# Patient Record
Sex: FEMALE | Race: WHITE | Employment: OTHER | URBAN - METROPOLITAN AREA
[De-identification: names, ages, dates, MRNs, and addresses within clinical notes are randomized per-mention and may not be internally consistent; named-entity substitution may affect disease eponyms.]

---

## 2017-03-28 ENCOUNTER — HOSPITAL ENCOUNTER (EMERGENCY)
Age: 57
Discharge: HOME | End: 2017-03-28
Attending: EMERGENCY MEDICINE | Admitting: EMERGENCY MEDICINE
Payer: COMMERCIAL

## 2017-03-28 ENCOUNTER — APPOINTMENT (OUTPATIENT)
Dept: GENERAL RADIOLOGY | Age: 57
End: 2017-03-28
Attending: EMERGENCY MEDICINE
Payer: COMMERCIAL

## 2017-03-28 VITALS
BODY MASS INDEX: 29.66 KG/M2 | TEMPERATURE: 98.9 F | HEART RATE: 73 BPM | OXYGEN SATURATION: 95 % | WEIGHT: 178 LBS | RESPIRATION RATE: 16 BRPM | SYSTOLIC BLOOD PRESSURE: 105 MMHG | HEIGHT: 65 IN | DIASTOLIC BLOOD PRESSURE: 53 MMHG

## 2017-03-28 DIAGNOSIS — R06.02 SOB (SHORTNESS OF BREATH): ICD-10-CM

## 2017-03-28 DIAGNOSIS — M25.512 ACUTE PAIN OF LEFT SHOULDER: Primary | ICD-10-CM

## 2017-03-28 LAB
ALBUMIN SERPL BCP-MCNC: 3.6 G/DL (ref 3.4–5)
ALBUMIN/GLOB SERPL: 1.3 {RATIO} (ref 0.8–1.7)
ALP SERPL-CCNC: 67 U/L (ref 45–117)
ALT SERPL-CCNC: 26 U/L (ref 13–56)
ANION GAP BLD CALC-SCNC: 7 MMOL/L (ref 3–18)
AST SERPL W P-5'-P-CCNC: 17 U/L (ref 15–37)
ATRIAL RATE: 69 BPM
BASOPHILS # BLD AUTO: 0.1 K/UL (ref 0–0.06)
BASOPHILS # BLD: 1 % (ref 0–2)
BILIRUB SERPL-MCNC: 0.3 MG/DL (ref 0.2–1)
BNP SERPL-MCNC: 18 PG/ML (ref 0–900)
BUN SERPL-MCNC: 10 MG/DL (ref 7–18)
BUN/CREAT SERPL: 16 (ref 12–20)
CALCIUM SERPL-MCNC: 8.5 MG/DL (ref 8.5–10.1)
CALCULATED P AXIS, ECG09: 65 DEGREES
CALCULATED R AXIS, ECG10: 30 DEGREES
CALCULATED T AXIS, ECG11: 27 DEGREES
CHLORIDE SERPL-SCNC: 104 MMOL/L (ref 100–108)
CO2 SERPL-SCNC: 28 MMOL/L (ref 21–32)
CREAT SERPL-MCNC: 0.64 MG/DL (ref 0.6–1.3)
DIAGNOSIS, 93000: NORMAL
DIFFERENTIAL METHOD BLD: ABNORMAL
EOSINOPHIL # BLD: 0.3 K/UL (ref 0–0.4)
EOSINOPHIL NFR BLD: 5 % (ref 0–5)
ERYTHROCYTE [DISTWIDTH] IN BLOOD BY AUTOMATED COUNT: 12.4 % (ref 11.6–14.5)
GLOBULIN SER CALC-MCNC: 2.7 G/DL (ref 2–4)
GLUCOSE SERPL-MCNC: 99 MG/DL (ref 74–99)
HCT VFR BLD AUTO: 37.9 % (ref 35–45)
HGB BLD-MCNC: 13 G/DL (ref 12–16)
LYMPHOCYTES # BLD AUTO: 45 % (ref 21–52)
LYMPHOCYTES # BLD: 2.3 K/UL (ref 0.9–3.6)
MCH RBC QN AUTO: 32.5 PG (ref 24–34)
MCHC RBC AUTO-ENTMCNC: 34.3 G/DL (ref 31–37)
MCV RBC AUTO: 94.8 FL (ref 74–97)
MONOCYTES # BLD: 0.4 K/UL (ref 0.05–1.2)
MONOCYTES NFR BLD AUTO: 8 % (ref 3–10)
NEUTS SEG # BLD: 2.1 K/UL (ref 1.8–8)
NEUTS SEG NFR BLD AUTO: 41 % (ref 40–73)
P-R INTERVAL, ECG05: 176 MS
PLATELET # BLD AUTO: 227 K/UL (ref 135–420)
PMV BLD AUTO: 10.8 FL (ref 9.2–11.8)
POTASSIUM SERPL-SCNC: 3.8 MMOL/L (ref 3.5–5.5)
PROT SERPL-MCNC: 6.3 G/DL (ref 6.4–8.2)
Q-T INTERVAL, ECG07: 370 MS
QRS DURATION, ECG06: 94 MS
QTC CALCULATION (BEZET), ECG08: 396 MS
RBC # BLD AUTO: 4 M/UL (ref 4.2–5.3)
SODIUM SERPL-SCNC: 139 MMOL/L (ref 136–145)
TROPONIN I BLD-MCNC: <0.04 NG/ML (ref 0–0.08)
TROPONIN I BLD-MCNC: <0.04 NG/ML (ref 0–0.08)
VENTRICULAR RATE, ECG03: 69 BPM
WBC # BLD AUTO: 5.2 K/UL (ref 4.6–13.2)

## 2017-03-28 PROCEDURE — 99285 EMERGENCY DEPT VISIT HI MDM: CPT

## 2017-03-28 PROCEDURE — 80053 COMPREHEN METABOLIC PANEL: CPT | Performed by: EMERGENCY MEDICINE

## 2017-03-28 PROCEDURE — 83880 ASSAY OF NATRIURETIC PEPTIDE: CPT | Performed by: EMERGENCY MEDICINE

## 2017-03-28 PROCEDURE — 85025 COMPLETE CBC W/AUTO DIFF WBC: CPT | Performed by: EMERGENCY MEDICINE

## 2017-03-28 PROCEDURE — 84484 ASSAY OF TROPONIN QUANT: CPT

## 2017-03-28 PROCEDURE — 71020 XR CHEST PA LAT: CPT

## 2017-03-28 PROCEDURE — 93005 ELECTROCARDIOGRAM TRACING: CPT

## 2017-03-28 NOTE — ED PROVIDER NOTES
HPI Comments: Hunter Norwood is a 60 y.o. Female no sig pmh who woke this morning with left shoulder, left arm numbness about 3am with her chest not feeling right. No sweats, nausea, vomiting, abd pain, recent malave, exertional cp, fatigue, edema, fcs. Smoke for several years. Sister about to get cabg tomorrow which has also caused pt worry and lack of sleep. No recent surgery, h/o cancer, vte, immobilization    The history is provided by the patient. Past Medical History:   Diagnosis Date    Anemia     Anxiety     Back pain     Diabetes ([address]     Hypertension     Left leg pain        Past Surgical History:   Procedure Laterality Date    HX GASTRIC BYPASS           Family History:   Problem Relation Age of Onset    Heart Disease Father        Social History     Social History    Marital status:      Spouse name: N/A    Number of children: N/A    Years of education: N/A     Occupational History    Not on file. Social History Main Topics    Smoking status: Current Every Day Smoker    Smokeless tobacco: Not on file    Alcohol use No    Drug use: Not on file    Sexual activity: Not on file     Other Topics Concern    Not on file     Social History Narrative         ALLERGIES: Review of patient's allergies indicates no known allergies. Review of Systems   Constitutional: Negative for fever. HENT: Negative for sore throat. Eyes: Negative for visual disturbance. Respiratory: Positive for shortness of breath. Negative for cough, wheezing and stridor. Cardiovascular: Negative for palpitations and leg swelling. Gastrointestinal: Negative for abdominal pain. Endocrine: Negative for polyuria. Genitourinary: Negative for difficulty urinating and dysuria. Musculoskeletal: Negative for gait problem. Skin: Negative for rash. Neurological: Negative for syncope. Hematological: Does not bruise/bleed easily. Psychiatric/Behavioral: Positive for sleep disturbance.    All other systems reviewed and are negative. Vitals:    03/28/17 0445 03/28/17 0500 03/28/17 0515   BP:  131/81 117/72   Pulse:  76 70   Resp:  11 12   Temp: 98.3 °F (36.8 °C)     SpO2:  99% 96%   Weight: 80.7 kg (178 lb)     Height: 5' 5\" (1.651 m)              Physical Exam   Constitutional: She is oriented to person, place, and time. She appears well-developed and well-nourished. No distress. HENT:   Head: Normocephalic and atraumatic. Right Ear: External ear normal.   Left Ear: External ear normal.   Nose: Nose normal.   Mouth/Throat: Uvula is midline, oropharynx is clear and moist and mucous membranes are normal.   Eyes: Conjunctivae are normal. No scleral icterus. Neck: Neck supple. Cardiovascular: Normal rate, regular rhythm, normal heart sounds and intact distal pulses. Pulmonary/Chest: Effort normal and breath sounds normal.   Abdominal: Soft. There is no tenderness. Musculoskeletal: She exhibits no edema. Neurological: She is alert and oriented to person, place, and time. Gait normal.   Skin: Skin is warm and dry. She is not diaphoretic. Psychiatric: Her behavior is normal.   Nursing note and vitals reviewed.        [facility]  ED Course       Procedures  Vitals:  Patient Vitals for the past 12 hrs:   Temp Pulse Resp BP SpO2   03/28/17 0515 - 70 12 117/72 96 %   03/28/17 0500 - 76 11 131/81 99 %   03/28/17 0445 98.3 °F (36.8 °C) - - - -         Medications ordered:   Medications - No data to display      Lab findings:  Recent Results (from the past 12 hour(s))   CBC WITH AUTOMATED DIFF    Collection Time: 03/28/17  4:55 AM   Result Value Ref Range    WBC 5.2 4.6 - 13.2 K/uL    RBC 4.00 (L) 4.20 - 5.30 M/uL    HGB 13.0 12.0 - 16.0 g/dL    HCT 37.9 35.0 - 45.0 %    MCV 94.8 74.0 - 97.0 FL    MCH 32.5 24.0 - 34.0 PG    MCHC 34.3 31.0 - 37.0 g/dL    RDW 12.4 11.6 - 14.5 %    PLATELET 962 164 - 619 K/uL    MPV 10.8 9.2 - 11.8 FL    NEUTROPHILS 41 40 - 73 %    LYMPHOCYTES 45 21 - 52 %    MONOCYTES 8 3 - 10 %    EOSINOPHILS 5 0 - 5 %    BASOPHILS 1 0 - 2 %    ABS. NEUTROPHILS 2.1 1.8 - 8.0 K/UL    ABS. LYMPHOCYTES 2.3 0.9 - 3.6 K/UL    ABS. MONOCYTES 0.4 0.05 - 1.2 K/UL    ABS. EOSINOPHILS 0.3 0.0 - 0.4 K/UL    ABS. BASOPHILS 0.1 (H) 0.0 - 0.06 K/UL    DF AUTOMATED     METABOLIC PANEL, COMPREHENSIVE    Collection Time: 03/28/17  4:55 AM   Result Value Ref Range    Sodium 139 136 - 145 mmol/L    Potassium 3.8 3.5 - 5.5 mmol/L    Chloride 104 100 - 108 mmol/L    CO2 28 21 - 32 mmol/L    Anion gap 7 3.0 - 18 mmol/L    Glucose 99 74 - 99 mg/dL    BUN 10 7.0 - 18 MG/DL    Creatinine 0.64 0.6 - 1.3 MG/DL    BUN/Creatinine ratio 16 12 - 20      GFR est AA >60 >60 ml/min/1.73m2    GFR est non-AA >60 >60 ml/min/1.73m2    Calcium 8.5 8.5 - 10.1 MG/DL    Bilirubin, total 0.3 0.2 - 1.0 MG/DL    ALT (SGPT) 26 13 - 56 U/L    AST (SGOT) 17 15 - 37 U/L    Alk.  phosphatase 67 45 - 117 U/L    Protein, total 6.3 (L) 6.4 - 8.2 g/dL    Albumin 3.6 3.4 - 5.0 g/dL    Globulin 2.7 2.0 - 4.0 g/dL    A-G Ratio 1.3 0.8 - 1.7     PRO-BNP    Collection Time: 03/28/17  4:55 AM   Result Value Ref Range    NT pro-BNP 18 0 - 900 PG/ML   EKG, 12 LEAD, INITIAL    Collection Time: 03/28/17  5:08 AM   Result Value Ref Range    Ventricular Rate 69 BPM    Atrial Rate 69 BPM    P-R Interval 176 ms    QRS Duration 94 ms    Q-T Interval 370 ms    QTC Calculation (Bezet) 396 ms    Calculated P Axis 65 degrees    Calculated R Axis 30 degrees    Calculated T Axis 27 degrees    Diagnosis       Normal sinus rhythm  Normal ECG  When compared with ECG of 30-JUN-2014 11:02,  No significant change was found     POC TROPONIN-I    Collection Time: 03/28/17  5:16 AM   Result Value Ref Range    Troponin-I (POC) <0.04 0.00 - 0.08 ng/mL       EKG interpretation by ED Physician:  nsr with no acute st tw changes; no changed from 2014  Rate 69, qtc 396    Prehosp: nsr with no acute st tw changes  Rate 80. qtc 442        X-Ray, CT or other radiology findings or impressions:  XR CHEST PA LAT    (Results Pending)     Nap per my interp  Progress notes, Consult notes or additional Procedure notes:   Pt without sig pain, complaints desire for any medications  Will repeat istat trop at 3 hours which was d/w pt along with need for outpatient f/u  Will turn over to dr Edilma Swanson to recheck trop and d/c if neg    Reevaluation of patient:   Stable for d/c     Disposition:  Diagnosis:   1. Acute pain of left shoulder    2. SOB (shortness of breath)        Disposition: home    Follow-up Information     Follow up With Details Comments Contact Info    make an appointment with your regular doctor this week for recheck       [facility] EMERGENCY DEPT  If symptoms worsen [id number]  [phone]            Patient's Medications   Start Taking    No medications on file   Continue Taking    CYANOCOBALAMIN (VITAMIN B-12) 1,000 MCG/ML INJECTION    1,000 mcg by IntraMUSCular route every seven (7) days. These Medications have changed    No medications on file   Stop Taking    ATORVASTATIN (LIPITOR) 10 MG TABLET    Take  by mouth daily. AZITHROMYCIN (ZITHROMAX Z-LARY) 250 MG TABLET    Take 2 tablets PO today, then 1 tablet daily x 4 days    ESCITALOPRAM (LEXAPRO) 10 MG TABLET    Take 10 mg by mouth daily. FERROUS FUMARATE (IRON PO)    Take  by mouth. LISINOPRIL (PRINIVIL, ZESTRIL) 10 MG TABLET    Take  by mouth daily. METFORMIN (GLUCOPHAGE) 500 MG TABLET    Take  by mouth two (2) times daily (with meals). OXYCODONE-ACETAMINOPHEN (PERCOCET 10)  MG PER TABLET    Take 1 Tab by mouth every four (4) hours as needed for Pain.

## 2017-03-28 NOTE — ED NOTES
8: 40 AM Updating the patient on her troponin results. I had a long discussion with her regarding following up with Dr. Lili Bland. She verbalized understanding and agreement. Scribe Attestation:   Evelin Wilson acting as a scribe for and in the presence of Dr. Kellie Mcarthur MD March 28, 2017 at 8:37 AM       Signed by: Callie Acharya, March 28, 2017 at 8:40 AM       Provider Attestation:   I personally performed the services described in the documentation, reviewed the documentation, as recorded by the scribe in my presence, and it accurately and completely records my words and actions.      Reviewed and signed by:  Dr. Kellie Mcarthur MD

## 2017-04-01 ENCOUNTER — HOSPITAL ENCOUNTER (EMERGENCY)
Age: 57
Discharge: HOME | End: 2017-04-01
Attending: EMERGENCY MEDICINE
Payer: COMMERCIAL

## 2017-04-01 ENCOUNTER — APPOINTMENT (OUTPATIENT)
Dept: GENERAL RADIOLOGY | Age: 57
End: 2017-04-01
Attending: EMERGENCY MEDICINE
Payer: COMMERCIAL

## 2017-04-01 VITALS
HEIGHT: 66 IN | HEART RATE: 76 BPM | OXYGEN SATURATION: 97 % | DIASTOLIC BLOOD PRESSURE: 72 MMHG | BODY MASS INDEX: 28.93 KG/M2 | RESPIRATION RATE: 19 BRPM | SYSTOLIC BLOOD PRESSURE: 111 MMHG | WEIGHT: 180 LBS | TEMPERATURE: 98.2 F

## 2017-04-01 DIAGNOSIS — R07.9 ACUTE CHEST PAIN: Primary | ICD-10-CM

## 2017-04-01 LAB
ANION GAP BLD CALC-SCNC: 8 MMOL/L (ref 3–18)
APTT PPP: 26.9 SEC (ref 23–36.4)
ATRIAL RATE: 55 BPM
ATRIAL RATE: 67 BPM
BASOPHILS # BLD AUTO: 0 K/UL (ref 0–0.06)
BASOPHILS # BLD: 1 % (ref 0–2)
BUN SERPL-MCNC: 8 MG/DL (ref 7–18)
BUN/CREAT SERPL: 14 (ref 12–20)
CALCIUM SERPL-MCNC: 9.2 MG/DL (ref 8.5–10.1)
CALCULATED P AXIS, ECG09: 55 DEGREES
CALCULATED P AXIS, ECG09: 72 DEGREES
CALCULATED R AXIS, ECG10: 45 DEGREES
CALCULATED R AXIS, ECG10: 56 DEGREES
CALCULATED T AXIS, ECG11: 28 DEGREES
CALCULATED T AXIS, ECG11: 50 DEGREES
CHLORIDE SERPL-SCNC: 104 MMOL/L (ref 100–108)
CK MB CFR SERPL CALC: 1.2 % (ref 0–4)
CK MB SERPL-MCNC: 1.1 NG/ML (ref 5–25)
CK SERPL-CCNC: 94 U/L (ref 26–192)
CO2 SERPL-SCNC: 29 MMOL/L (ref 21–32)
CREAT SERPL-MCNC: 0.58 MG/DL (ref 0.6–1.3)
D DIMER PPP FEU-MCNC: <0.27 UG/ML(FEU)
DIAGNOSIS, 93000: NORMAL
DIAGNOSIS, 93000: NORMAL
DIFFERENTIAL METHOD BLD: ABNORMAL
EOSINOPHIL # BLD: 0.2 K/UL (ref 0–0.4)
EOSINOPHIL NFR BLD: 4 % (ref 0–5)
ERYTHROCYTE [DISTWIDTH] IN BLOOD BY AUTOMATED COUNT: 12.6 % (ref 11.6–14.5)
GLUCOSE SERPL-MCNC: 99 MG/DL (ref 74–99)
HCT VFR BLD AUTO: 40.6 % (ref 35–45)
HGB BLD-MCNC: 13.7 G/DL (ref 12–16)
INR PPP: 1 (ref 0.8–1.2)
LYMPHOCYTES # BLD AUTO: 28 % (ref 21–52)
LYMPHOCYTES # BLD: 1.4 K/UL (ref 0.9–3.6)
MAGNESIUM SERPL-MCNC: 2.1 MG/DL (ref 1.8–2.4)
MCH RBC QN AUTO: 32.1 PG (ref 24–34)
MCHC RBC AUTO-ENTMCNC: 33.7 G/DL (ref 31–37)
MCV RBC AUTO: 95.1 FL (ref 74–97)
MONOCYTES # BLD: 0.6 K/UL (ref 0.05–1.2)
MONOCYTES NFR BLD AUTO: 11 % (ref 3–10)
NEUTS SEG # BLD: 2.8 K/UL (ref 1.8–8)
NEUTS SEG NFR BLD AUTO: 56 % (ref 40–73)
P-R INTERVAL, ECG05: 168 MS
P-R INTERVAL, ECG05: 176 MS
PLATELET # BLD AUTO: 231 K/UL (ref 135–420)
PMV BLD AUTO: 10.6 FL (ref 9.2–11.8)
POTASSIUM SERPL-SCNC: 3.9 MMOL/L (ref 3.5–5.5)
PROTHROMBIN TIME: 13.1 SEC (ref 11.5–15.2)
Q-T INTERVAL, ECG07: 388 MS
Q-T INTERVAL, ECG07: 396 MS
QRS DURATION, ECG06: 94 MS
QRS DURATION, ECG06: 94 MS
QTC CALCULATION (BEZET), ECG08: 378 MS
QTC CALCULATION (BEZET), ECG08: 409 MS
RBC # BLD AUTO: 4.27 M/UL (ref 4.2–5.3)
SODIUM SERPL-SCNC: 141 MMOL/L (ref 136–145)
TROPONIN I SERPL-MCNC: <0.02 NG/ML (ref 0–0.04)
TROPONIN I SERPL-MCNC: <0.02 NG/ML (ref 0–0.04)
VENTRICULAR RATE, ECG03: 55 BPM
VENTRICULAR RATE, ECG03: 67 BPM
WBC # BLD AUTO: 5 K/UL (ref 4.6–13.2)

## 2017-04-01 PROCEDURE — 80048 BASIC METABOLIC PNL TOTAL CA: CPT | Performed by: EMERGENCY MEDICINE

## 2017-04-01 PROCEDURE — 83735 ASSAY OF MAGNESIUM: CPT | Performed by: EMERGENCY MEDICINE

## 2017-04-01 PROCEDURE — 93005 ELECTROCARDIOGRAM TRACING: CPT

## 2017-04-01 PROCEDURE — 71010 XR CHEST PORT: CPT

## 2017-04-01 PROCEDURE — 85379 FIBRIN DEGRADATION QUANT: CPT | Performed by: EMERGENCY MEDICINE

## 2017-04-01 PROCEDURE — 99284 EMERGENCY DEPT VISIT MOD MDM: CPT

## 2017-04-01 PROCEDURE — 85610 PROTHROMBIN TIME: CPT | Performed by: EMERGENCY MEDICINE

## 2017-04-01 PROCEDURE — 85025 COMPLETE CBC W/AUTO DIFF WBC: CPT | Performed by: EMERGENCY MEDICINE

## 2017-04-01 PROCEDURE — 85730 THROMBOPLASTIN TIME PARTIAL: CPT | Performed by: EMERGENCY MEDICINE

## 2017-04-01 PROCEDURE — 82550 ASSAY OF CK (CPK): CPT | Performed by: EMERGENCY MEDICINE

## 2017-04-01 NOTE — ED NOTES
Pt discharged to home ambulatory and in company of self  Discharge instructions provided via discussion and handout. Teaching to patient. Verbalized understanding. No questions voiced. Discharged with 2 RX.

## 2017-04-01 NOTE — DISCHARGE INSTRUCTIONS
Chest Pain: Care Instructions  Your Care Instructions  There are many things that can cause chest pain. Some are not serious and will get better on their own in a few days. But some kinds of chest pain need more testing and treatment. Your doctor may have recommended a follow-up visit in the next 8 to 12 hours. If you are not getting better, you may need more tests or treatment. Even though your doctor has released you, you still need to watch for any problems. The doctor carefully checked you, but sometimes problems can develop later. If you have new symptoms or if your symptoms do not get better, get medical care right away. If you have worse or different chest pain or pressure that lasts more than 5 minutes or you passed out (lost consciousness), call 911 or seek other emergency help right away. A medical visit is only one step in your treatment. Even if you feel better, you still need to do what your doctor recommends, such as going to all suggested follow-up appointments and taking medicines exactly as directed. This will help you recover and help prevent future problems. How can you care for yourself at home? · Rest until you feel better. · Take your medicine exactly as prescribed. Call your doctor if you think you are having a problem with your medicine. · Do not drive after taking a prescription pain medicine. When should you call for help? Call 911 if:  · You passed out (lost consciousness). · You have severe difficulty breathing. · You have symptoms of a heart attack. These may include:  ¨ Chest pain or pressure, or a strange feeling in your chest.  ¨ Sweating. ¨ Shortness of breath. ¨ Nausea or vomiting. ¨ Pain, pressure, or a strange feeling in your back, neck, jaw, or upper belly or in one or both shoulders or arms. ¨ Lightheadedness or sudden weakness. ¨ A fast or irregular heartbeat.   After you call 911, the  may tell you to chew 1 adult-strength or 2 to 4 low-dose aspirin. Wait for an ambulance. Do not try to drive yourself. Call your doctor today if:  · You have any trouble breathing. · Your chest pain gets worse. · You are dizzy or lightheaded, or you feel like you may faint. · You are not getting better as expected. · You are having new or different chest pain. Where can you learn more? Go to http://christina-elías.info/. Enter A120 in the search box to learn more about \"Chest Pain: Care Instructions. \"  Current as of: May 27, 2016  Content Version: 11.2  © 0628-3674 [employer]. Care instructions adapted under license by [employer] (which disclaims liability or warranty for this information). If you have questions about a medical condition or this instruction, always ask your healthcare professional. [address] any warranty or liability for your use of this information.

## 2017-04-01 NOTE — ED PROVIDER NOTES
HPI Comments: 8:21 AM Jazmin Velazquez is a 60 y.o. female with history of HTN and DM who presents to the ED c/o SOB which began 6 days ago and has become increasingly worse since then. Pt also complains of chest discomfort, nausea, HA, non-productive cough and L arm numbness. Pt describes the discomfort as \"achy. \" Pt states walking and movement exacerbates the SOB. Pt denies any recent surgery or travel. Pt was brought here last night by ambulance and was discharged. Pt has taken Tylenol and 3 81mg aspirin for the discomfort with relief. She has last taken the aspirin at 0600 this morning. Pt states she has seen a cardiologist 10 years ago but has not been recently. Pt denies any fever, cough, or any other symptoms at this time. All questions and concerns addressed at this time. PCP: Jennifer Stephenson MD      The history is provided by the patient. Past Medical History:   Diagnosis Date    Anemia     Anxiety     Back pain     Diabetes ([address]     Hypertension     Left leg pain        Past Surgical History:   Procedure Laterality Date    HX GASTRIC BYPASS           Family History:   Problem Relation Age of Onset    Heart Disease Father        Social History     Social History    Marital status:      Spouse name: N/A    Number of children: N/A    Years of education: N/A     Occupational History    Not on file. Social History Main Topics    Smoking status: Current Every Day Smoker    Smokeless tobacco: Not on file    Alcohol use No    Drug use: Not on file    Sexual activity: Not on file     Other Topics Concern    Not on file     Social History Narrative         ALLERGIES: Review of patient's allergies indicates no known allergies. Review of Systems   Constitutional: Negative for fever. HENT: Negative for congestion. Respiratory: Positive for cough and shortness of breath. Cardiovascular: Positive for chest pain (\"discomfort\"). Negative for leg swelling.    Gastrointestinal: Positive for nausea. Negative for abdominal pain and vomiting. Genitourinary: Negative for dysuria. Musculoskeletal: Negative. Neurological: Positive for numbness (L arm) and headaches. Negative for speech difficulty. All other systems reviewed and are negative. There were no vitals filed for this visit. Physical Exam   Constitutional:   General:  Well-developed, well-nourished, no apparent distress. Head:  Normocephalic atraumatic. Eyes:  Pupils midrange extraocular movements intact. No pallor or conjunctival injection. Nose:  No rhinorrhea, inspection grossly normal.    Ears:  Grossly normal to inspection, no discharge. Mouth:  Mucous membranes moist, no appreciable intraoral lesion. Neck:  Trachea midline, no asymmetry. Chest:  Grossly normal inspection, symmetric chest rise. Pulmonary:  Clear to auscultation bilaterally no wheezes rhonchi or rales. Cardiovascular:  S1-S2 no murmurs rubs or gallops. Abdomen: Soft, nontender, nondistended no guarding rebound or peritoneal signs. Extremities:  Grossly normal to inspection, peripheral pulses intact. No edema no asymmetry no pain on palpation of the calves  Neurologic:  Alert and oriented no appreciable focal neurologic deficit. Psychiatric:  Grossly normal mood and affect. Nursing note reviewed, vital signs reviewed. MDM  Number of Diagnoses or Management Options  Diagnosis management comments: ED course:  Patient with central chest tightness mild shortness of breath and radiation of pain. Primary concern is for ACS, she does have a sister who is getting a CABG for CAD. She was seen 2 days ago, unable to follow with primary care or cardiologist during that time. She is very concerned about having a heart attack and wants to be admitted for a stress test.    EKG done at 0 819: Normal sinus rhythm heart rate 67 intervals within normal limits no ST changes or ectopy.     Patient did take a full aspirin this morning, will send labs monitor pain and plan for admission for stress test that she was unable to receive a stress test in the recommended AHA time frame. ED Course       Procedures      1000 Consult:  Discussed care with Dr. Juju Mejia. Standard discussion; including history of patients chief complaint, available diagnostic results, and treatment course. Reports patient should be discharged to follow up with cardiology as an outpatient if 2nd troponin is negative,    Repeat EKG done at 1306: Sinus tachycardia heart rate 55 intervals within normal limits no ST changes or ectopy. Repeat troponin and d-dimer negative    Patient understands that she is stable for outpatient workup, she will return here with any concerns. Did print her a prescription for nuclear medicine stress test, also referred her to her cardiologist that she requested. Patient's history, physical exam and laboratory evaluations were reviewed. Had discussion with the patient about the possible etiologies of chest pain. Heart score: 3    Patient was felt to be low risk for major adverse cardiac event, was  informed about the risks and benefits and alternatives of inpatient versus outpatient workup. At this time patient is stable for outpatient management including follow-up with primary care physician/cardiology for reevaluation within 72 hours. Patient is aware that no evaluation in the emergency department can ensure 0% risk, patient will return to the emergency department with any concerns. Disposition:    Discharged home      Portions of this chart were created with Dragon medical speech to text program.   Unrecognized errors may be present.     CALLIE ATTESTATION STATEMENT  Documented by: Juli Love for, and in the presence of, Rahul Yeung MD 8:31 AM     Signed by: Callie Marie, 04/01/17 8:31 AM    PROVIDER ATTESTATION STATEMENT  I personally performed the services described in the documentation, reviewed the documentation, as recorded by the scribe in my presence, and it accurately and completely records my words and actions.   Júnior Manzanares MD

## 2017-04-04 ENCOUNTER — HOSPITAL ENCOUNTER (OUTPATIENT)
Dept: NON INVASIVE DIAGNOSTICS | Age: 57
Discharge: HOME | End: 2017-04-04
Attending: EMERGENCY MEDICINE
Payer: COMMERCIAL

## 2017-04-04 ENCOUNTER — HOSPITAL ENCOUNTER (OUTPATIENT)
Dept: NUCLEAR MEDICINE | Age: 57
Discharge: HOME | End: 2017-04-04
Attending: EMERGENCY MEDICINE
Payer: COMMERCIAL

## 2017-04-04 DIAGNOSIS — R07.9 ACUTE CHEST PAIN: ICD-10-CM

## 2017-04-04 PROCEDURE — 93017 CV STRESS TEST TRACING ONLY: CPT | Performed by: EMERGENCY MEDICINE

## 2017-04-04 PROCEDURE — 78452 HT MUSCLE IMAGE SPECT MULT: CPT

## 2017-04-10 ENCOUNTER — OFFICE VISIT (OUTPATIENT)
Dept: CARDIOLOGY CLINIC | Age: 57
End: 2017-04-10

## 2017-04-10 VITALS
WEIGHT: 178 LBS | BODY MASS INDEX: 28.61 KG/M2 | OXYGEN SATURATION: 97 % | SYSTOLIC BLOOD PRESSURE: 123 MMHG | HEIGHT: 66 IN | HEART RATE: 74 BPM | DIASTOLIC BLOOD PRESSURE: 80 MMHG

## 2017-04-10 DIAGNOSIS — R06.00 DYSPNEA, UNSPECIFIED TYPE: Primary | ICD-10-CM

## 2017-04-10 RX ORDER — LANOLIN ALCOHOL/MO/W.PET/CERES
CREAM (GRAM) TOPICAL DAILY
COMMUNITY

## 2017-04-10 RX ORDER — MELATONIN
1000 DAILY
COMMUNITY
End: 2017-04-19

## 2017-04-10 RX ORDER — ACETAMINOPHEN, DIPHENHYDRAMINE HCL, PHENYLEPHRINE HCL 325; 25; 5 MG/1; MG/1; MG/1
5000 TABLET ORAL DAILY
COMMUNITY

## 2017-04-10 NOTE — PROGRESS NOTES
[facility]    Jodi Knight is a pleasant 60year old female with a history of anxiety disorder, borderline diabetes, obesity, status post gastric bypass surgery in 1996 and tobacco abuse disorder in the past.    Ms. Christiano Strickland is here to establish care with me. Ms. Christiano Strickland was in her usual state of health until about two weeks ago. Two weeks ago a family member had a cardiac problem which was unexpected. Family member had to undergo open heart surgery and had an eventful and stressful hospital course. She has been having a lot of stressful situations over the last two weeks. On two different occasions over the last two weeks she had symptoms where she was feeling normal and suddenly started feeling short of breath, for which she went to the emergency department. She denies any prolonged episode of chest pain. She feels like she almost did not have any chest pain, but main concern was getting short of breath. She went to the emergency department and was told her EKG and enzymes were normal.  She never had these kind of symptoms before. She feels like she's been under a lot of stress because of above mentioned reason. She otherwise denies any exertional chest pain or chest tightness. She denies any PND or orthopnea. She denies any lower extremity swelling. Denies any nausea, vomiting, abdominal pain, fever, chills, sputum production.  No hematuria or other bleeding complaints    Past Medical History:   Diagnosis Date    Anemia     Anxiety     Back pain     Borderline diabetes     Diet controlled    H/O gastric bypass 1996    Max weight 250 lbs before gastric bypass    Tobacco abuse, in remission          Past Surgical History:   Procedure Laterality Date    HX GASTRIC BYPASS         Current Outpatient Prescriptions   Medication Sig    CALCIUM POLYCARBOPHIL (FIBER-TABS PO) 1 tablet by mouth daily    cholecalciferol (VITAMIN D3) 1,000 unit tablet Take 1,000 Units by mouth daily.  cyanocobalamin, vitamin B-12, 5,000 mcg subl 5,000 mcg by SubLINGual route daily.  ferrous sulfate (IRON) 325 mg (65 mg iron) tablet Take  by mouth daily. No current facility-administered medications for this visit. Allergies and Sensitivities:  No Known Allergies    Family History:  Family History   Problem Relation Age of Onset    Heart Disease Father        Social History:  Social History   Substance Use Topics    Smoking status: Current Every Day Smoker    Smokeless tobacco: None    Alcohol use No     She  reports that she has been smoking. She does not have any smokeless tobacco history on file. She  reports that she does not drink alcohol. Review of Systems:  Cardiac symptoms as noted above in HPI. All others negative. Denies fatigue, malaise, skin rash, joint pain, blurring vision, photophobia, neck pain, hemoptysis, chronic cough, nausea, vomiting, hematuria, burning micturition, BRBPR, chronic headaches. Physical Exam:  BP Readings from Last 3 Encounters:   04/10/17 123/80   04/01/17 111/72   03/28/17 105/53         Pulse Readings from Last 3 Encounters:   04/10/17 74   04/01/17 76   03/28/17 73          Wt Readings from Last 3 Encounters:   04/10/17 178 lb (80.7 kg)   04/01/17 180 lb (81.6 kg)   03/28/17 178 lb (80.7 kg)       Constitutional: Oriented to person, place, and time. HENT: Head: Normocephalic and atraumatic. Neck: No JVD present. Carotid bruit is not appreciated. Cardiovascular: Regular rhythm. No murmur, gallop or rubs appreciated  Lung: Breath sounds normal. No respiratory distress. No ronchi or rales appreciated  Abdominal: No tenderness. No rebound and no guarding. Musculoskeletal: There is no lower extremity edema. No cynosis  Lymphadenopathy:  No cervical or supraclavicular adenopathy appriciated. Neurological: No gross motor deficit noted. Skin: No visible skin rash noted.    No Ear discharge noted  Psychiatric: Normal mood and affect. Good distal pulse    Review of Data  LABS:   Lab Results   Component Value Date/Time    Sodium 141 04/01/2017 08:50 AM    Potassium 3.9 04/01/2017 08:50 AM    Chloride 104 04/01/2017 08:50 AM    CO2 29 04/01/2017 08:50 AM    Glucose 99 04/01/2017 08:50 AM    BUN 8 04/01/2017 08:50 AM    Creatinine 0.58 04/01/2017 08:50 AM     No flowsheet data found. Lab Results   Component Value Date/Time    ALT (SGPT) 26 03/28/2017 04:55 AM     No results found for: HBA1C, HGBE8, QZY2EMUU, LQN3SWEJ, XCC8FAFB    EKG  (04/17) Sinus bradycardia at 55 beats per minute. Normal WV and QRS interval.  No pathologic Q wave. No ST changes of ischemia. I personally reviewed and interpreted this EKG. ECHO    STRESS TEST (04/17)  Fair exercise tolerance. Normal blood pressure response to exercise. Myocardial perfusion imaging is normal  There is no area of prior scarring or ongoing ischemia. Overall left ventricle systolic function was normal  without regional wall motion abnormalities (as noted above). Risk/extent of ischemia: Low risk. CATHETERIZATION    IMPRESSION & PLAN:  Ms. Hali Moore is a 60year old female with history of obesity, status post gastric bypass surgery, anxiety, borderline diabetes. Ms. Hali Moore recently had an episode of some shortness of breath in a stressful situation resulting into two different emergency department visits. Eventually she underwent nuclear stress test as mentioned above, which did not show any area of scarring or ischemia. It was a low risk stress test.  Ejection fraction was reported to be normal.  Ms. Hali Moore at this time denies any symptoms to suggest angina. I believe that she has been undergoing a tremendous amount of stress because of health problems with the family member over the last two weeks. I do not believe she had any typical anginal symptoms.   Above mentioned nuclear stress test is low risk test with reported cardiac mortality less than 1%. This was communicated with the patient and she felt reassured. Risk factor modification will be addressed at this time. If she continues to have symptoms of dyspnea and any typical symptoms of angina, she will call me. All symptoms of angina were discussed with the patient. Echocardiogram will be considered in the future if she continues to have occasional dyspnea to evaluate diastolic dysfunction. On nuclear stress test ejection fraction was normal.    Ms. Parish Bruno had a gastric bypass surgery and since then she's not on any diabetic medication or hypertensive medication since then. She's been feeling fine. Her blood pressure is very well controlled at this time. I would defer rest of the medical problems to the primary care provider. Currently she denies any symptoms to suggest angina or unstable coronary syndrome. She has no evidence of fluid overload at this time. She will call me back in six months and follow up appointment in six months or sooner if necessary. Importance of diet and exercise was discussed with patient. This plan was discussed with patient who is in agreement. Thank you for allowing me to participate in patient care. Please feel free to call me if you have any question or concern. Vince Bahena MD  Please note: This document has been produced using voice recognition software. Unrecognized errors in transcription may be present.

## 2017-04-10 NOTE — LETTER
Patient:  Porter Rosa YOB: 1960 Date of Visit: 4/10/2017 Dear Monique Mike,  
[address] [facility] [address] [place] [id number] VIA Facsimile: [phone] 
 : 
 
 
 
                                                           [facility] Marylen Grief is a pleasant 60year old female with a history of anxiety disorder, borderline diabetes, obesity, status post gastric bypass surgery in 1996 and tobacco abuse disorder in the past. 
 
Ms. Dariana Palomo is here to establish care with me. Ms. Dariana Palomo was in her usual state of health until about two weeks ago. Two weeks ago a family member had a cardiac problem which was unexpected. Family member had to undergo open heart surgery and had an eventful and stressful hospital course. She has been having a lot of stressful situations over the last two weeks. On two different occasions over the last two weeks she had symptoms where she was feeling normal and suddenly started feeling short of breath, for which she went to the emergency department. She denies any prolonged episode of chest pain. She feels like she almost did not have any chest pain, but main concern was getting short of breath. She went to the emergency department and was told her EKG and enzymes were normal.  She never had these kind of symptoms before. She feels like she's been under a lot of stress because of above mentioned reason. She otherwise denies any exertional chest pain or chest tightness. She denies any PND or orthopnea. She denies any lower extremity swelling. Denies any nausea, vomiting, abdominal pain, fever, chills, sputum production. No hematuria or other bleeding complaints Past Medical History:  
Diagnosis Date  Anemia  Anxiety  Back pain  Borderline diabetes Diet controlled  H/O gastric bypass 1996 Max weight 250 lbs before gastric bypass  Tobacco abuse, in remission Past Surgical History: Procedure Laterality Date  HX GASTRIC BYPASS Current Outpatient Prescriptions Medication Sig  CALCIUM POLYCARBOPHIL (FIBER-TABS PO) 1 tablet by mouth daily  cholecalciferol (VITAMIN D3) 1,000 unit tablet Take 1,000 Units by mouth daily.  cyanocobalamin, vitamin B-12, 5,000 mcg subl 5,000 mcg by SubLINGual route daily.  ferrous sulfate (IRON) 325 mg (65 mg iron) tablet Take  by mouth daily. No current facility-administered medications for this visit. Allergies and Sensitivities: 
No Known Allergies Family History: 
Family History Problem Relation Age of Onset  Heart Disease Father Social History: 
Social History Substance Use Topics  Smoking status: Current Every Day Smoker  Smokeless tobacco: None  Alcohol use No  
 
She  reports that she has been smoking. She does not have any smokeless tobacco history on file. She  reports that she does not drink alcohol. Review of Systems: 
Cardiac symptoms as noted above in HPI. All others negative. Denies fatigue, malaise, skin rash, joint pain, blurring vision, photophobia, neck pain, hemoptysis, chronic cough, nausea, vomiting, hematuria, burning micturition, BRBPR, chronic headaches. Physical Exam: 
BP Readings from Last 3 Encounters:  
04/10/17 123/80  
04/01/17 111/72  
03/28/17 105/53 Pulse Readings from Last 3 Encounters:  
04/10/17 74  
04/01/17 76  
03/28/17 73 Wt Readings from Last 3 Encounters:  
04/10/17 178 lb (80.7 kg) 04/01/17 180 lb (81.6 kg) 03/28/17 178 lb (80.7 kg) Constitutional: Oriented to person, place, and time. HENT: Head: Normocephalic and atraumatic. Neck: No JVD present. Carotid bruit is not appreciated. Cardiovascular: Regular rhythm. No murmur, gallop or rubs appreciated Lung: Breath sounds normal. No respiratory distress. No ronchi or rales appreciated Abdominal: No tenderness. No rebound and no guarding. Musculoskeletal: There is no lower extremity edema. No cynosis Lymphadenopathy:  No cervical or supraclavicular adenopathy appriciated. Neurological: No gross motor deficit noted. Skin: No visible skin rash noted. No Ear discharge noted Psychiatric: Normal mood and affect. Good distal pulse Review of Data LABS:  
Lab Results Component Value Date/Time Sodium 141 04/01/2017 08:50 AM  
 Potassium 3.9 04/01/2017 08:50 AM  
 Chloride 104 04/01/2017 08:50 AM  
 CO2 29 04/01/2017 08:50 AM  
 Glucose 99 04/01/2017 08:50 AM  
 BUN 8 04/01/2017 08:50 AM  
 Creatinine 0.58 04/01/2017 08:50 AM  
 
No flowsheet data found. Lab Results Component Value Date/Time ALT (SGPT) 26 03/28/2017 04:55 AM  
 
No results found for: HBA1C, HGBE8, RMZ4HNSZ, WVU2MFGG, NTH3MZCV 
 
EKG 
(04/17) Sinus bradycardia at 55 beats per minute. Normal RI and QRS interval.  No pathologic Q wave. No ST changes of ischemia. I personally reviewed and interpreted this EKG. ECHO 
 
STRESS TEST (04/17) Fair exercise tolerance. Normal blood pressure response to exercise. Myocardial perfusion imaging is normal 
There is no area of prior scarring or ongoing ischemia. Overall left ventricle systolic function was normal 
without regional wall motion abnormalities (as noted above). Risk/extent of ischemia: Low risk. CATHETERIZATION IMPRESSION & PLAN: 
Ms. Orin Morrow is a 60year old female with history of obesity, status post gastric bypass surgery, anxiety, borderline diabetes. Ms. Orin Morrow recently had an episode of some shortness of breath in a stressful situation resulting into two different emergency department visits. Eventually she underwent nuclear stress test as mentioned above, which did not show any area of scarring or ischemia. It was a low risk stress test.  Ejection fraction was reported to be normal.  Ms. Orin Morrow at this time denies any symptoms to suggest angina.   I believe that she has been undergoing a tremendous amount of stress because of health problems with the family member over the last two weeks. I do not believe she had any typical anginal symptoms. Above mentioned nuclear stress test is low risk test with reported cardiac mortality less than 1%. This was communicated with the patient and she felt reassured. Risk factor modification will be addressed at this time. If she continues to have symptoms of dyspnea and any typical symptoms of angina, she will call me. All symptoms of angina were discussed with the patient. Echocardiogram will be considered in the future if she continues to have occasional dyspnea to evaluate diastolic dysfunction. On nuclear stress test ejection fraction was normal. 
 
Ms. Jerome Cedillo had a gastric bypass surgery and since then she's not on any diabetic medication or hypertensive medication since then. She's been feeling fine. Her blood pressure is very well controlled at this time. I would defer rest of the medical problems to the primary care provider. Currently she denies any symptoms to suggest angina or unstable coronary syndrome. She has no evidence of fluid overload at this time. She will call me back in six months and follow up appointment in six months or sooner if necessary. Importance of diet and exercise was discussed with patient. This plan was discussed with patient who is in agreement. Thank you for allowing me to participate in patient care. Please feel free to call me if you have any question or concern. Barby Fields MD 
Please note: This document has been produced using voice recognition software. Unrecognized errors in transcription may be present. Sincerely, Santhosh Huertas MD

## 2017-04-10 NOTE — MR AVS SNAPSHOT
Visit Information Date & Time Provider Department Dept. Phone Encounter #  
 4/10/2017  2:15 PM Kilo Schaeffer  [facility] Specialist at [facility] [id number] Follow-up Instructions Return in about 1 year (around 4/10/2018). 4/19/2017  1:45 PM  
Any with Valentine Harvey MD  
[facility]. [address] ([address]) Appt Note: Np dx Chronic Uti, Ref Rafia MONROE [phone],  notes [facility] [place] [address]  
[phone]  
  
   
 [place] [id number] Upcoming Health Maintenance Date Due Hepatitis C Screening 1960 Pneumococcal 19-64 Medium Risk (1 of 1 - PPSV23) 10/7/1977 DTaP/Tdap/Td series (1 - Tdap) 10/7/1979 BREAST CANCER SCRN MAMMOGRAM 10/7/2008 FOBT Q 1 YEAR AGE 50-75 10/7/2008 PAP AKA CERVICAL CYTOLOGY 9/12/2015 INFLUENZA AGE 9 TO ADULT 8/1/2016 Allergies as of 4/10/2017  Review Complete On: 4/10/2017 By: Madina Cruz LPN No Known Allergies Current Immunizations  Never Reviewed No immunizations on file. Not reviewed this visit Vitals BP Pulse Height(growth percentile) Weight(growth percentile) SpO2 BMI  
 123/80 74 5' 6\" (1.676 m) 178 lb (80.7 kg) 97% 28.73 kg/m2 OB Status Smoking Status Postmenopausal Current Every Day Smoker BMI and BSA Data Body Mass Index Body Surface Area 28.73 kg/m 2 1.94 m 2 Your Updated Medication List  
  
   
This list is accurate as of: 4/10/17  2:50 PM.  Always use your most recent med list.  
  
  
  
  
 cholecalciferol 1,000 unit tablet Commonly known as:  VITAMIN D3 Take 1,000 Units by mouth daily. cyanocobalamin (vitamin B-12) 5,000 mcg Subl  
5,000 mcg by SubLINGual route daily. FIBER-TABS PO  
1 tablet by mouth daily Iron 325 mg (65 mg iron) tablet Generic drug:  ferrous sulfate Take  by mouth daily. Follow-up Instructions Return in about 1 year (around 4/10/2018). Introducing [facility]! Dear Vish Jackson: Thank you for requesting a [employer] account. Our records indicate that you already have an active [employer] account. You can access your account anytime at [email or web address] Did you know that you can access your hospital and ER discharge instructions at any time in [employer]? You can also review all of your test results from your hospital stay or ER visit. Additional Information If you have questions, please visit the Frequently Asked Questions section of the [employer] website at [email or web address] Remember, [employer] is NOT to be used for urgent needs. For medical emergencies, dial 911. Now available from your iPhone and Android! Please provide this summary of care documentation to your next provider. Your primary care clinician is listed as Anni Weston. If you have any questions after today's visit, please call [phone].

## 2017-04-24 NOTE — COMMUNICATION BODY
[facility]    Christa Acosta is a pleasant 60year old female with a history of anxiety disorder, borderline diabetes, obesity, status post gastric bypass surgery in 1996 and tobacco abuse disorder in the past.    Ms. Coretta Coronado is here to establish care with me. Ms. Coretta Coronado was in her usual state of health until about two weeks ago. Two weeks ago a family member had a cardiac problem which was unexpected. Family member had to undergo open heart surgery and had an eventful and stressful hospital course. She has been having a lot of stressful situations over the last two weeks. On two different occasions over the last two weeks she had symptoms where she was feeling normal and suddenly started feeling short of breath, for which she went to the emergency department. She denies any prolonged episode of chest pain. She feels like she almost did not have any chest pain, but main concern was getting short of breath. She went to the emergency department and was told her EKG and enzymes were normal.  She never had these kind of symptoms before. She feels like she's been under a lot of stress because of above mentioned reason. She otherwise denies any exertional chest pain or chest tightness. She denies any PND or orthopnea. She denies any lower extremity swelling. Denies any nausea, vomiting, abdominal pain, fever, chills, sputum production.  No hematuria or other bleeding complaints    Past Medical History:   Diagnosis Date    Anemia     Anxiety     Back pain     Borderline diabetes     Diet controlled    H/O gastric bypass 1996    Max weight 250 lbs before gastric bypass    Tobacco abuse, in remission          Past Surgical History:   Procedure Laterality Date    HX GASTRIC BYPASS         Current Outpatient Prescriptions   Medication Sig    CALCIUM POLYCARBOPHIL (FIBER-TABS PO) 1 tablet by mouth daily    cholecalciferol (VITAMIN D3) 1,000 unit tablet Take 1,000 Units by mouth daily.  cyanocobalamin, vitamin B-12, 5,000 mcg subl 5,000 mcg by SubLINGual route daily.  ferrous sulfate (IRON) 325 mg (65 mg iron) tablet Take  by mouth daily. No current facility-administered medications for this visit. Allergies and Sensitivities:  No Known Allergies    Family History:  Family History   Problem Relation Age of Onset    Heart Disease Father        Social History:  Social History   Substance Use Topics    Smoking status: Current Every Day Smoker    Smokeless tobacco: None    Alcohol use No     She  reports that she has been smoking. She does not have any smokeless tobacco history on file. She  reports that she does not drink alcohol. Review of Systems:  Cardiac symptoms as noted above in HPI. All others negative. Denies fatigue, malaise, skin rash, joint pain, blurring vision, photophobia, neck pain, hemoptysis, chronic cough, nausea, vomiting, hematuria, burning micturition, BRBPR, chronic headaches. Physical Exam:  BP Readings from Last 3 Encounters:   04/10/17 123/80   04/01/17 111/72   03/28/17 105/53         Pulse Readings from Last 3 Encounters:   04/10/17 74   04/01/17 76   03/28/17 73          Wt Readings from Last 3 Encounters:   04/10/17 178 lb (80.7 kg)   04/01/17 180 lb (81.6 kg)   03/28/17 178 lb (80.7 kg)       Constitutional: Oriented to person, place, and time. HENT: Head: Normocephalic and atraumatic. Neck: No JVD present. Carotid bruit is not appreciated. Cardiovascular: Regular rhythm. No murmur, gallop or rubs appreciated  Lung: Breath sounds normal. No respiratory distress. No ronchi or rales appreciated  Abdominal: No tenderness. No rebound and no guarding. Musculoskeletal: There is no lower extremity edema. No cynosis  Lymphadenopathy:  No cervical or supraclavicular adenopathy appriciated. Neurological: No gross motor deficit noted. Skin: No visible skin rash noted.    No Ear discharge noted  Psychiatric: Normal mood and affect. Good distal pulse    Review of Data  LABS:   Lab Results   Component Value Date/Time    Sodium 141 04/01/2017 08:50 AM    Potassium 3.9 04/01/2017 08:50 AM    Chloride 104 04/01/2017 08:50 AM    CO2 29 04/01/2017 08:50 AM    Glucose 99 04/01/2017 08:50 AM    BUN 8 04/01/2017 08:50 AM    Creatinine 0.58 04/01/2017 08:50 AM     No flowsheet data found. Lab Results   Component Value Date/Time    ALT (SGPT) 26 03/28/2017 04:55 AM     No results found for: HBA1C, HGBE8, WNJ9ZMOB, BRH1HFNR, CIK7ZFKL    EKG  (04/17) Sinus bradycardia at 55 beats per minute. Normal NC and QRS interval.  No pathologic Q wave. No ST changes of ischemia. I personally reviewed and interpreted this EKG. ECHO    STRESS TEST (04/17)  Fair exercise tolerance. Normal blood pressure response to exercise. Myocardial perfusion imaging is normal  There is no area of prior scarring or ongoing ischemia. Overall left ventricle systolic function was normal  without regional wall motion abnormalities (as noted above). Risk/extent of ischemia: Low risk. CATHETERIZATION    IMPRESSION & PLAN:  Ms. Gokul Acosta is a 60year old female with history of obesity, status post gastric bypass surgery, anxiety, borderline diabetes. Ms. Gokul Acosta recently had an episode of some shortness of breath in a stressful situation resulting into two different emergency department visits. Eventually she underwent nuclear stress test as mentioned above, which did not show any area of scarring or ischemia. It was a low risk stress test.  Ejection fraction was reported to be normal.  Ms. Gokul Acosta at this time denies any symptoms to suggest angina. I believe that she has been undergoing a tremendous amount of stress because of health problems with the family member over the last two weeks. I do not believe she had any typical anginal symptoms.   Above mentioned nuclear stress test is low risk test with reported cardiac mortality less than 1%. This was communicated with the patient and she felt reassured. Risk factor modification will be addressed at this time. If she continues to have symptoms of dyspnea and any typical symptoms of angina, she will call me. All symptoms of angina were discussed with the patient. Echocardiogram will be considered in the future if she continues to have occasional dyspnea to evaluate diastolic dysfunction. On nuclear stress test ejection fraction was normal.    Ms. Demetrius Maradiaga had a gastric bypass surgery and since then she's not on any diabetic medication or hypertensive medication since then. She's been feeling fine. Her blood pressure is very well controlled at this time. I would defer rest of the medical problems to the primary care provider. Currently she denies any symptoms to suggest angina or unstable coronary syndrome. She has no evidence of fluid overload at this time. She will call me back in six months and follow up appointment in six months or sooner if necessary. Importance of diet and exercise was discussed with patient. This plan was discussed with patient who is in agreement. Thank you for allowing me to participate in patient care. Please feel free to call me if you have any question or concern. Aakash La MD  Please note: This document has been produced using voice recognition software. Unrecognized errors in transcription may be present.

## 2017-12-23 ENCOUNTER — APPOINTMENT (OUTPATIENT)
Dept: GENERAL RADIOLOGY | Age: 57
End: 2017-12-23
Attending: EMERGENCY MEDICINE
Payer: COMMERCIAL

## 2017-12-23 ENCOUNTER — HOSPITAL ENCOUNTER (EMERGENCY)
Age: 57
Discharge: HOME | End: 2017-12-23
Attending: EMERGENCY MEDICINE
Payer: COMMERCIAL

## 2017-12-23 VITALS
TEMPERATURE: 97.7 F | OXYGEN SATURATION: 97 % | RESPIRATION RATE: 18 BRPM | HEART RATE: 78 BPM | SYSTOLIC BLOOD PRESSURE: 110 MMHG | DIASTOLIC BLOOD PRESSURE: 57 MMHG

## 2017-12-23 DIAGNOSIS — R07.9 ACUTE CHEST PAIN: Primary | ICD-10-CM

## 2017-12-23 LAB
ALBUMIN SERPL-MCNC: 3.8 G/DL (ref 3.4–5)
ALBUMIN/GLOB SERPL: 1.4 {RATIO} (ref 0.8–1.7)
ALP SERPL-CCNC: 63 U/L (ref 45–117)
ALT SERPL-CCNC: 28 U/L (ref 13–56)
ANION GAP SERPL CALC-SCNC: 9 MMOL/L (ref 3–18)
APPEARANCE UR: CLEAR
AST SERPL-CCNC: 20 U/L (ref 15–37)
ATRIAL RATE: 74 BPM
BACTERIA URNS QL MICRO: ABNORMAL /HPF
BASOPHILS # BLD: 0 K/UL (ref 0–0.06)
BASOPHILS NFR BLD: 1 % (ref 0–2)
BILIRUB SERPL-MCNC: 0.2 MG/DL (ref 0.2–1)
BILIRUB UR QL: NEGATIVE
BUN SERPL-MCNC: 13 MG/DL (ref 7–18)
BUN/CREAT SERPL: 23 (ref 12–20)
CALCIUM SERPL-MCNC: 8.8 MG/DL (ref 8.5–10.1)
CALCULATED P AXIS, ECG09: 64 DEGREES
CALCULATED R AXIS, ECG10: 59 DEGREES
CALCULATED T AXIS, ECG11: 43 DEGREES
CHLORIDE SERPL-SCNC: 104 MMOL/L (ref 100–108)
CO2 SERPL-SCNC: 29 MMOL/L (ref 21–32)
COLOR UR: YELLOW
CREAT SERPL-MCNC: 0.57 MG/DL (ref 0.6–1.3)
DIAGNOSIS, 93000: NORMAL
DIFFERENTIAL METHOD BLD: ABNORMAL
EOSINOPHIL # BLD: 0.2 K/UL (ref 0–0.4)
EOSINOPHIL NFR BLD: 4 % (ref 0–5)
EPITH CASTS URNS QL MICRO: ABNORMAL /LPF (ref 0–5)
ERYTHROCYTE [DISTWIDTH] IN BLOOD BY AUTOMATED COUNT: 12 % (ref 11.6–14.5)
GLOBULIN SER CALC-MCNC: 2.8 G/DL (ref 2–4)
GLUCOSE SERPL-MCNC: 118 MG/DL (ref 74–99)
GLUCOSE UR STRIP.AUTO-MCNC: NEGATIVE MG/DL
HCT VFR BLD AUTO: 38.1 % (ref 35–45)
HGB BLD-MCNC: 12.8 G/DL (ref 12–16)
HGB UR QL STRIP: NEGATIVE
KETONES UR QL STRIP.AUTO: NEGATIVE MG/DL
LEUKOCYTE ESTERASE UR QL STRIP.AUTO: ABNORMAL
LIPASE SERPL-CCNC: 113 U/L (ref 73–393)
LYMPHOCYTES # BLD: 1.7 K/UL (ref 0.9–3.6)
LYMPHOCYTES NFR BLD: 29 % (ref 21–52)
MAGNESIUM SERPL-MCNC: 2 MG/DL (ref 1.6–2.6)
MCH RBC QN AUTO: 32.2 PG (ref 24–34)
MCHC RBC AUTO-ENTMCNC: 33.6 G/DL (ref 31–37)
MCV RBC AUTO: 95.7 FL (ref 74–97)
MONOCYTES # BLD: 0.8 K/UL (ref 0.05–1.2)
MONOCYTES NFR BLD: 14 % (ref 3–10)
NEUTS SEG # BLD: 3.3 K/UL (ref 1.8–8)
NEUTS SEG NFR BLD: 52 % (ref 40–73)
NITRITE UR QL STRIP.AUTO: NEGATIVE
P-R INTERVAL, ECG05: 144 MS
PH UR STRIP: 5 [PH] (ref 5–8)
PLATELET # BLD AUTO: 207 K/UL (ref 135–420)
PMV BLD AUTO: 10.3 FL (ref 9.2–11.8)
POTASSIUM SERPL-SCNC: 4 MMOL/L (ref 3.5–5.5)
PROT SERPL-MCNC: 6.6 G/DL (ref 6.4–8.2)
PROT UR STRIP-MCNC: NEGATIVE MG/DL
Q-T INTERVAL, ECG07: 398 MS
QRS DURATION, ECG06: 96 MS
QTC CALCULATION (BEZET), ECG08: 441 MS
RBC # BLD AUTO: 3.98 M/UL (ref 4.2–5.3)
RBC #/AREA URNS HPF: NEGATIVE /HPF (ref 0–5)
SODIUM SERPL-SCNC: 142 MMOL/L (ref 136–145)
SP GR UR REFRACTOMETRY: 1 (ref 1–1.03)
TROPONIN I SERPL-MCNC: <0.02 NG/ML (ref 0–0.04)
UROBILINOGEN UR QL STRIP.AUTO: 0.2 EU/DL (ref 0.2–1)
VENTRICULAR RATE, ECG03: 74 BPM
WBC # BLD AUTO: 6.1 K/UL (ref 4.6–13.2)
WBC URNS QL MICRO: ABNORMAL /HPF (ref 0–4)

## 2017-12-23 PROCEDURE — 99284 EMERGENCY DEPT VISIT MOD MDM: CPT

## 2017-12-23 PROCEDURE — 85025 COMPLETE CBC W/AUTO DIFF WBC: CPT | Performed by: EMERGENCY MEDICINE

## 2017-12-23 PROCEDURE — 81001 URINALYSIS AUTO W/SCOPE: CPT | Performed by: EMERGENCY MEDICINE

## 2017-12-23 PROCEDURE — 83735 ASSAY OF MAGNESIUM: CPT | Performed by: EMERGENCY MEDICINE

## 2017-12-23 PROCEDURE — 71010 XR CHEST PORT: CPT

## 2017-12-23 PROCEDURE — 74011250637 HC RX REV CODE- 250/637: Performed by: EMERGENCY MEDICINE

## 2017-12-23 PROCEDURE — 74011250636 HC RX REV CODE- 250/636: Performed by: EMERGENCY MEDICINE

## 2017-12-23 PROCEDURE — 93005 ELECTROCARDIOGRAM TRACING: CPT

## 2017-12-23 PROCEDURE — 84484 ASSAY OF TROPONIN QUANT: CPT | Performed by: EMERGENCY MEDICINE

## 2017-12-23 PROCEDURE — 80053 COMPREHEN METABOLIC PANEL: CPT | Performed by: EMERGENCY MEDICINE

## 2017-12-23 PROCEDURE — 83690 ASSAY OF LIPASE: CPT | Performed by: EMERGENCY MEDICINE

## 2017-12-23 PROCEDURE — 96360 HYDRATION IV INFUSION INIT: CPT

## 2017-12-23 RX ORDER — LORAZEPAM 0.5 MG/1
0.5 TABLET ORAL
Qty: 6 TAB | Refills: 0 | Status: SHIPPED | OUTPATIENT
Start: 2017-12-23 | End: 2021-04-28 | Stop reason: ALTCHOICE

## 2017-12-23 RX ORDER — LORAZEPAM 0.5 MG/1
0.5 TABLET ORAL
Status: COMPLETED | OUTPATIENT
Start: 2017-12-23 | End: 2017-12-23

## 2017-12-23 RX ADMIN — SODIUM CHLORIDE 500 ML: 900 INJECTION, SOLUTION INTRAVENOUS at 03:40

## 2017-12-23 RX ADMIN — LORAZEPAM 0.5 MG: 0.5 TABLET ORAL at 04:14

## 2017-12-23 NOTE — ED TRIAGE NOTES
Pt c/o bilateral extremity weakness to arms and legs, headaches, SOB, chills for the past couple nights; \"chalky\" bowel movements and acid reflux as well.

## 2017-12-23 NOTE — ED PROVIDER NOTES
EMERGENCY DEPARTMENT HISTORY AND PHYSICAL EXAM    3:13 AM      Date: 12/23/2017  Patient Name: Daniel Chacon    History of Presenting Illness     Chief Complaint   Patient presents with    Extremity Weakness    Shortness of Breath    Other         History Provided By: Patient    Chief Complaint: Near syncope sensation   Duration: 3 Days  Timing:  Intermittent  Severity: Moderate  Modifying Factors: worse with laying down   Associated Symptoms: left sided cp, HA, lightheadedness, extremity weakness x 4, nausea, RLQ pain, chalky stool, chills, sob      Additional History (Context): Daniel Chacon is a 59 y.o. Female, former smoker, nonalcohol drinker, with hx of past angina and gastric bypass from [address] [place] [address], presents to the ED with near syncope sensation x 3 days. Sx worsen with laying down. Pt specifically complains of a severe HA, extremity weakness x 3, chills, shortness of breath, lightheadedness as well of mild, occasional left sided chest pain lasting for a few seconds each time. She also complains of chalky appearance to her stool x 2 weeks as well as of intermittent sharp RLQ pain. Dysuria, frequency, urgency, fever, cough, emesis, BM abnormality and diarrhea are denied. PT does not have hx of DVT or PE. She has not recently taken any long trips. Pt admits to past evaluation in the ED for chest pain. She explains that her follow up with a cardiologist after her ED visit only led to an axiety diagnosis. Pt was stressed at that time because of her sister's illness. Pt is now stressed secondary to having to take care of her ill . No medication changes, no other complaints. PCP: Kassie Leon, DO    Current Outpatient Prescriptions   Medication Sig Dispense Refill    LORazepam (ATIVAN) 0.5 mg tablet Take 1 Tab by mouth every eight (8) hours as needed for Anxiety.  Max Daily Amount: 1.5 mg. 6 Tab 0    atorvastatin (LIPITOR) 10 mg tablet 10 mg.      cholecalciferol (VITAMIN D3) 1,000 unit tablet 2 tabs po qd      CALCIUM POLYCARBOPHIL (FIBER-TABS PO) 1 tablet by mouth daily      cyanocobalamin, vitamin B-12, 5,000 mcg subl 5,000 mcg by SubLINGual route daily.  ferrous sulfate (IRON) 325 mg (65 mg iron) tablet Take  by mouth daily.  fluticasone (FLONASE) 50 mcg/actuation nasal spray          Past History     Past Medical History:  Past Medical History:   Diagnosis Date    Anemia     Anxiety     Back pain     Borderline diabetes     Diet controlled    H/O gastric bypass 1996    Max weight 250 lbs before gastric bypass    Tobacco abuse, in remission        Past Surgical History:  Past Surgical History:   Procedure Laterality Date    HX GASTRIC BYPASS         Family History:  Family History   Problem Relation Age of Onset    Heart Disease Father        Social History:  Social History   Substance Use Topics    Smoking status: Former Smoker    Smokeless tobacco: None    Alcohol use No       Allergies:  No Known Allergies      Review of Systems       Review of Systems   Constitutional: Positive for chills. Eyes: Positive for itching. Respiratory: Positive for shortness of breath. Cardiovascular: Positive for chest pain (left chest, mild). Gastrointestinal: Positive for abdominal pain (RLQ) and nausea. Negative for diarrhea and vomiting. Chalky appearance to stool    Neurological: Positive for weakness (extremities x 4), light-headedness and headaches. Near syncope sensation     All other systems reviewed and are negative. Physical Exam     Visit Vitals    /57    Pulse 78    Temp 97.7 °F (36.5 °C)    Resp 18    SpO2 97%         Physical Exam   Constitutional:   General:  Well-developed, well-nourished, no apparent distress. Head:  Normocephalic atraumatic. Eyes:  Pupils midrange extraocular movements intact. No pallor or conjunctival injection.     Nose:  No rhinorrhea, inspection grossly normal.    Ears:  Grossly normal to inspection, no discharge. Mouth:  Mucous membranes moist, no appreciable intraoral lesion. Neck/Back:  Trachea midline, no asymmetry. Chest:  Grossly normal inspection, symmetric chest rise. Pulmonary:  Clear to auscultation bilaterally no wheezes rhonchi or rales. Cardiovascular:  S1-S2 no murmurs rubs or gallops. Abdomen: Soft, nontender, nondistended no guarding rebound or peritoneal signs. Extremities:  Grossly normal to inspection, peripheral pulses intact    Neurologic:  Alert and oriented no appreciable focal neurologic deficit. Skin:  Warm and dry  Psychiatric:  Grossly normal mood and affect. Nursing note reviewed, vital signs reviewed. Diagnostic Study Results     Labs -  Recent Results (from the past 12 hour(s))   CBC WITH AUTOMATED DIFF    Collection Time: 12/23/17  3:46 AM   Result Value Ref Range    WBC 6.1 4.6 - 13.2 K/uL    RBC 3.98 (L) 4.20 - 5.30 M/uL    HGB 12.8 12.0 - 16.0 g/dL    HCT 38.1 35.0 - 45.0 %    MCV 95.7 74.0 - 97.0 FL    MCH 32.2 24.0 - 34.0 PG    MCHC 33.6 31.0 - 37.0 g/dL    RDW 12.0 11.6 - 14.5 %    PLATELET 982 954 - 931 K/uL    MPV 10.3 9.2 - 11.8 FL    NEUTROPHILS 52 40 - 73 %    LYMPHOCYTES 29 21 - 52 %    MONOCYTES 14 (H) 3 - 10 %    EOSINOPHILS 4 0 - 5 %    BASOPHILS 1 0 - 2 %    ABS. NEUTROPHILS 3.3 1.8 - 8.0 K/UL    ABS. LYMPHOCYTES 1.7 0.9 - 3.6 K/UL    ABS. MONOCYTES 0.8 0.05 - 1.2 K/UL    ABS. EOSINOPHILS 0.2 0.0 - 0.4 K/UL    ABS.  BASOPHILS 0.0 0.0 - 0.06 K/UL    DF AUTOMATED     METABOLIC PANEL, COMPREHENSIVE    Collection Time: 12/23/17  3:46 AM   Result Value Ref Range    Sodium 142 136 - 145 mmol/L    Potassium 4.0 3.5 - 5.5 mmol/L    Chloride 104 100 - 108 mmol/L    CO2 29 21 - 32 mmol/L    Anion gap 9 3.0 - 18 mmol/L    Glucose 118 (H) 74 - 99 mg/dL    BUN 13 7.0 - 18 MG/DL    Creatinine 0.57 (L) 0.6 - 1.3 MG/DL    BUN/Creatinine ratio 23 (H) 12 - 20      GFR est AA >60 >60 ml/min/1.73m2    GFR est non-AA >60 >60 ml/min/1.73m2    Calcium 8.8 8.5 - 10.1 MG/DL    Bilirubin, total 0.2 0.2 - 1.0 MG/DL    ALT (SGPT) 28 13 - 56 U/L    AST (SGOT) 20 15 - 37 U/L    Alk.  phosphatase 63 45 - 117 U/L    Protein, total 6.6 6.4 - 8.2 g/dL    Albumin 3.8 3.4 - 5.0 g/dL    Globulin 2.8 2.0 - 4.0 g/dL    A-G Ratio 1.4 0.8 - 1.7     LIPASE    Collection Time: 12/23/17  3:46 AM   Result Value Ref Range    Lipase 113 73 - 393 U/L   MAGNESIUM    Collection Time: 12/23/17  3:46 AM   Result Value Ref Range    Magnesium 2.0 1.6 - 2.6 mg/dL   TROPONIN I    Collection Time: 12/23/17  3:46 AM   Result Value Ref Range    Troponin-I, Qt. <0.02 0.0 - 0.045 NG/ML   EKG, 12 LEAD, INITIAL    Collection Time: 12/23/17  4:01 AM   Result Value Ref Range    Ventricular Rate 74 BPM    Atrial Rate 74 BPM    P-R Interval 144 ms    QRS Duration 96 ms    Q-T Interval 398 ms    QTC Calculation (Bezet) 441 ms    Calculated P Axis 64 degrees    Calculated R Axis 59 degrees    Calculated T Axis 43 degrees    Diagnosis       Poor data quality, interpretation may be adversely affected  Normal sinus rhythm  Normal ECG  When compared with ECG of 04-APR-2017 09:52,  No significant change was found     URINALYSIS W/ RFLX MICROSCOPIC    Collection Time: 12/23/17  4:15 AM   Result Value Ref Range    Color YELLOW      Appearance CLEAR      Specific gravity 1.005 1.005 - 1.030      pH (UA) 5.0 5.0 - 8.0      Protein NEGATIVE  NEG mg/dL    Glucose NEGATIVE  NEG mg/dL    Ketone NEGATIVE  NEG mg/dL    Bilirubin NEGATIVE  NEG      Blood NEGATIVE  NEG      Urobilinogen 0.2 0.2 - 1.0 EU/dL    Nitrites NEGATIVE  NEG      Leukocyte Esterase SMALL (A) NEG     URINE MICROSCOPIC ONLY    Collection Time: 12/23/17  4:15 AM   Result Value Ref Range    WBC 0 to 3 0 - 4 /hpf    RBC NEGATIVE  0 - 5 /hpf    Epithelial cells FEW 0 - 5 /lpf    Bacteria FEW (A) NEG /hpf       Radiologic Studies -   XR CHEST PORT    (Results Pending)     XR CHEST PORT  Impression by Dr. Sallyanne Dance at 5:17 AM  No pneumothorax or infiltrate Medical Decision Making   I am the first provider for this patient. I reviewed the vital signs, available nursing notes, past medical history, past surgical history, family history and social history. Vital Signs-Reviewed the patient's vital signs. Pulse Oximetry Analysis -  100% on room air (Interpretation) Normal    EKG: Interpreted by the EP. Time Interpreted: 4:01:33 am   Rate: 74   Rhythm: Normal Sinus Rhythm    Interpretation: Poor data quality, no ST changes, no T-wave inversions ; QRS duration 96 ms;                                    QT/QTc 398/441ms    Records Reviewed: Nursing Notes and Old Medical Records (Time of Review: 3:13 AM)    ED Course: Progress Notes, Reevaluation, and Consults:  ED course:  Patient presenting with chest pain and multiple other medical complaints. She thinks that she had a presentation like this in the past that was attributed to anxiety, has seen cardiology and no indication for further cardiology evaluation.   She is a does admit that she's been stressed by her  who has recently been discharged from the hospital.  Vital signs are unremarkable saturation normal on room air    Cardiac monitor: Normal sinus rhythm    Labs unremarkable    Chest x-ray per my interpretation no infiltrate or pneumothorax    Patient felt better after Ativan, requested a prescription, urged to follow up with her primary care physician for her medication management, since it is the holiday weekend, will discharge with a short course and she is aware that the emergency department cannot be response before providing controlled substance prescriptions in the future    She is also aware that I do not perform a full evaluation and cannot be sure that her subsequent presentations of chest pain or shortness breath or any other presentation are not life-threatening she was urged to follow up with her own primary care doctor and return here with concerning symptoms    Patient's presentation, history, physical exam and laboratory evaluations were reviewed. At this time patient was felt to be stable for outpatient management and follow with primary care/specialist.  Patient was instructed to return to the emergency department with any concerns. Disposition:    Discharged home      Portions of this chart were created with Dragon medical speech to text program.   Unrecognized errors may be present. Diagnosis     Clinical Impression:   1. Acute chest pain        Disposition: Discharged    Follow-up Information     Follow up With Details [address], DO Call in 2 days  [facility] [id number]  [phone]      [facility] EMERGENCY DEPT  As needed, If symptoms worsen [address]  [phone]           Discharge Medication List as of 12/23/2017  5:59 AM      CONTINUE these medications which have NOT CHANGED    Details   atorvastatin (LIPITOR) 10 mg tablet 10 mg., Historical Med      cholecalciferol (VITAMIN D3) 1,000 unit tablet 2 tabs po qd, Historical Med      CALCIUM POLYCARBOPHIL (FIBER-TABS PO) 1 tablet by mouth daily, Historical Med      cyanocobalamin, vitamin B-12, 5,000 mcg subl 5,000 mcg by SubLINGual route daily. , Historical Med      ferrous sulfate (IRON) 325 mg (65 mg iron) tablet Take  by mouth daily. , Historical Med      fluticasone (FLONASE) 50 mcg/actuation nasal spray Historical Med           _______________________________    Attestations:  Scribe Attestation     Gaby Quintana acting as a scribe for and in the presence of Edith Riley MD      December 23, 2017 at [facility]       Provider Attestation:      I personally performed the services described in the documentation, reviewed the documentation, as recorded by the scribe in my presence, and it accurately and completely records my words and actions.  December 23, 2017 at 3:13 AM - Edith Riley MD    _______________________________

## 2017-12-23 NOTE — DISCHARGE INSTRUCTIONS
Chest Pain: Care Instructions  Your Care Instructions    There are many things that can cause chest pain. Some are not serious and will get better on their own in a few days. But some kinds of chest pain need more testing and treatment. Your doctor may have recommended a follow-up visit in the next 8 to 12 hours. If you are not getting better, you may need more tests or treatment. Even though your doctor has released you, you still need to watch for any problems. The doctor carefully checked you, but sometimes problems can develop later. If you have new symptoms or if your symptoms do not get better, get medical care right away. If you have worse or different chest pain or pressure that lasts more than 5 minutes or you passed out (lost consciousness), call 911 or seek other emergency help right away. A medical visit is only one step in your treatment. Even if you feel better, you still need to do what your doctor recommends, such as going to all suggested follow-up appointments and taking medicines exactly as directed. This will help you recover and help prevent future problems. How can you care for yourself at home? · Rest until you feel better. · Take your medicine exactly as prescribed. Call your doctor if you think you are having a problem with your medicine. · Do not drive after taking a prescription pain medicine. When should you call for help? Call 911 if:  ? · You passed out (lost consciousness). ? · You have severe difficulty breathing. ? · You have symptoms of a heart attack. These may include:  ¨ Chest pain or pressure, or a strange feeling in your chest.  ¨ Sweating. ¨ Shortness of breath. ¨ Nausea or vomiting. ¨ Pain, pressure, or a strange feeling in your back, neck, jaw, or upper belly or in one or both shoulders or arms. ¨ Lightheadedness or sudden weakness. ¨ A fast or irregular heartbeat.   After you call 911, the  may tell you to chew 1 adult-strength or 2 to 4 low-dose aspirin. Wait for an ambulance. Do not try to drive yourself. ?Call your doctor today if:  ? · You have any trouble breathing. ? · Your chest pain gets worse. ? · You are dizzy or lightheaded, or you feel like you may faint. ? · You are not getting better as expected. ? · You are having new or different chest pain. Where can you learn more? Go to http://christina-elías.info/. Enter A120 in the search box to learn more about \"Chest Pain: Care Instructions. \"  Current as of: March 20, 2017  Content Version: 11.4  © 8090-1521 [employer]. Care instructions adapted under license by [employer] (which disclaims liability or warranty for this information). If you have questions about a medical condition or this instruction, always ask your healthcare professional. [address] any warranty or liability for your use of this information.

## 2018-07-23 ENCOUNTER — TELEPHONE (OUTPATIENT)
Dept: CARDIOLOGY CLINIC | Age: 58
End: 2018-07-23

## 2018-07-23 NOTE — TELEPHONE ENCOUNTER
Patient declined appointment as her  in now going to [facility], so is she for the time being.

## 2021-07-18 NOTE — ED TRIAGE NOTES
Patient presents to the ED with a primary complaint of shortness of breath at rest awakening her from her sleep. Patient also compliants of numbness to the left arm but states this is a chronic issue for her secondary to, \"A nerve problem\" to the left side. Patient also verbalizes a feeling in her chest that she is unable to describe, see pain note.
Unknown